# Patient Record
Sex: MALE | Race: WHITE | NOT HISPANIC OR LATINO | Employment: UNEMPLOYED | URBAN - METROPOLITAN AREA
[De-identification: names, ages, dates, MRNs, and addresses within clinical notes are randomized per-mention and may not be internally consistent; named-entity substitution may affect disease eponyms.]

---

## 2022-10-05 ENCOUNTER — APPOINTMENT (OUTPATIENT)
Dept: ULTRASOUND IMAGING | Facility: HOSPITAL | Age: 42
DRG: 263 | End: 2022-10-05
Payer: COMMERCIAL

## 2022-10-05 ENCOUNTER — ANESTHESIA (INPATIENT)
Dept: PERIOP | Facility: HOSPITAL | Age: 42
DRG: 263 | End: 2022-10-05
Payer: COMMERCIAL

## 2022-10-05 ENCOUNTER — APPOINTMENT (EMERGENCY)
Dept: CT IMAGING | Facility: HOSPITAL | Age: 42
DRG: 263 | End: 2022-10-05
Payer: COMMERCIAL

## 2022-10-05 ENCOUNTER — HOSPITAL ENCOUNTER (INPATIENT)
Facility: HOSPITAL | Age: 42
LOS: 1 days | Discharge: HOME | DRG: 263 | End: 2022-10-06
Attending: EMERGENCY MEDICINE | Admitting: SURGERY
Payer: COMMERCIAL

## 2022-10-05 ENCOUNTER — APPOINTMENT (OUTPATIENT)
Dept: MRI IMAGING | Facility: HOSPITAL | Age: 42
DRG: 263 | End: 2022-10-05
Payer: COMMERCIAL

## 2022-10-05 ENCOUNTER — ANESTHESIA EVENT (INPATIENT)
Dept: PERIOP | Facility: HOSPITAL | Age: 42
DRG: 263 | End: 2022-10-05
Payer: COMMERCIAL

## 2022-10-05 DIAGNOSIS — R73.03 PREDIABETES: ICD-10-CM

## 2022-10-05 DIAGNOSIS — K81.0 ACUTE CHOLECYSTITIS: ICD-10-CM

## 2022-10-05 DIAGNOSIS — R10.11 RUQ PAIN: Primary | ICD-10-CM

## 2022-10-05 DIAGNOSIS — E11.65 HYPERGLYCEMIA DUE TO DIABETES MELLITUS (HCC): ICD-10-CM

## 2022-10-05 PROBLEM — F17.200 SMOKING: Status: ACTIVE | Noted: 2022-10-05

## 2022-10-05 PROBLEM — F12.90 MARIJUANA USE: Status: ACTIVE | Noted: 2022-10-05

## 2022-10-05 LAB
ALBUMIN SERPL BCP-MCNC: 4.1 G/DL (ref 3.5–5)
ALP SERPL-CCNC: 67 U/L (ref 34–104)
ALT SERPL W P-5'-P-CCNC: 31 U/L (ref 7–52)
ANION GAP SERPL CALCULATED.3IONS-SCNC: 8 MMOL/L (ref 4–13)
AST SERPL W P-5'-P-CCNC: 29 U/L (ref 13–39)
BASOPHILS # BLD AUTO: 0.02 THOUSANDS/ΜL (ref 0–0.1)
BASOPHILS NFR BLD AUTO: 0 % (ref 0–1)
BILIRUB SERPL-MCNC: 3.08 MG/DL (ref 0.2–1)
BUN SERPL-MCNC: 15 MG/DL (ref 5–25)
CALCIUM SERPL-MCNC: 9.1 MG/DL (ref 8.4–10.2)
CHLORIDE SERPL-SCNC: 104 MMOL/L (ref 96–108)
CHOLEST SERPL-MCNC: 157 MG/DL
CO2 SERPL-SCNC: 26 MMOL/L (ref 21–32)
CREAT SERPL-MCNC: 0.88 MG/DL (ref 0.6–1.3)
EOSINOPHIL # BLD AUTO: 0.02 THOUSAND/ΜL (ref 0–0.61)
EOSINOPHIL NFR BLD AUTO: 0 % (ref 0–6)
ERYTHROCYTE [DISTWIDTH] IN BLOOD BY AUTOMATED COUNT: 12.1 % (ref 11.6–15.1)
EST. AVERAGE GLUCOSE BLD GHB EST-MCNC: 214 MG/DL
GFR SERPL CREATININE-BSD FRML MDRD: 105 ML/MIN/1.73SQ M
GLUCOSE SERPL-MCNC: 164 MG/DL (ref 65–140)
GLUCOSE SERPL-MCNC: 165 MG/DL (ref 65–140)
GLUCOSE SERPL-MCNC: 288 MG/DL (ref 65–140)
GLUCOSE SERPL-MCNC: 374 MG/DL (ref 65–140)
HBA1C MFR BLD: 9.1 %
HCT VFR BLD AUTO: 41 % (ref 36.5–49.3)
HDLC SERPL-MCNC: 47 MG/DL
HGB BLD-MCNC: 14.6 G/DL (ref 12–17)
IMM GRANULOCYTES # BLD AUTO: 0.01 THOUSAND/UL (ref 0–0.2)
IMM GRANULOCYTES NFR BLD AUTO: 0 % (ref 0–2)
LDLC SERPL CALC-MCNC: 98 MG/DL (ref 0–100)
LIPASE SERPL-CCNC: <6 U/L (ref 11–82)
LYMPHOCYTES # BLD AUTO: 1.51 THOUSANDS/ΜL (ref 0.6–4.47)
LYMPHOCYTES NFR BLD AUTO: 26 % (ref 14–44)
MCH RBC QN AUTO: 30.5 PG (ref 26.8–34.3)
MCHC RBC AUTO-ENTMCNC: 35.6 G/DL (ref 31.4–37.4)
MCV RBC AUTO: 86 FL (ref 82–98)
MONOCYTES # BLD AUTO: 0.45 THOUSAND/ΜL (ref 0.17–1.22)
MONOCYTES NFR BLD AUTO: 8 % (ref 4–12)
NEUTROPHILS # BLD AUTO: 3.75 THOUSANDS/ΜL (ref 1.85–7.62)
NEUTS SEG NFR BLD AUTO: 66 % (ref 43–75)
NONHDLC SERPL-MCNC: 110 MG/DL
NRBC BLD AUTO-RTO: 0 /100 WBCS
PLATELET # BLD AUTO: 201 THOUSANDS/UL (ref 149–390)
PMV BLD AUTO: 10.5 FL (ref 8.9–12.7)
POTASSIUM SERPL-SCNC: 3.9 MMOL/L (ref 3.5–5.3)
PROT SERPL-MCNC: 6.1 G/DL (ref 6.4–8.4)
RBC # BLD AUTO: 4.79 MILLION/UL (ref 3.88–5.62)
SODIUM SERPL-SCNC: 138 MMOL/L (ref 135–147)
TRIGL SERPL-MCNC: 59 MG/DL
WBC # BLD AUTO: 5.76 THOUSAND/UL (ref 4.31–10.16)

## 2022-10-05 PROCEDURE — 71260 CT THORAX DX C+: CPT

## 2022-10-05 PROCEDURE — 99222 1ST HOSP IP/OBS MODERATE 55: CPT | Performed by: SURGERY

## 2022-10-05 PROCEDURE — 88304 TISSUE EXAM BY PATHOLOGIST: CPT | Performed by: PATHOLOGY

## 2022-10-05 PROCEDURE — 74177 CT ABD & PELVIS W/CONTRAST: CPT

## 2022-10-05 PROCEDURE — 36415 COLL VENOUS BLD VENIPUNCTURE: CPT | Performed by: EMERGENCY MEDICINE

## 2022-10-05 PROCEDURE — 74181 MRI ABDOMEN W/O CONTRAST: CPT

## 2022-10-05 PROCEDURE — 99284 EMERGENCY DEPT VISIT MOD MDM: CPT | Performed by: EMERGENCY MEDICINE

## 2022-10-05 PROCEDURE — 82948 REAGENT STRIP/BLOOD GLUCOSE: CPT

## 2022-10-05 PROCEDURE — G1004 CDSM NDSC: HCPCS

## 2022-10-05 PROCEDURE — 83036 HEMOGLOBIN GLYCOSYLATED A1C: CPT

## 2022-10-05 PROCEDURE — 96374 THER/PROPH/DIAG INJ IV PUSH: CPT

## 2022-10-05 PROCEDURE — 83690 ASSAY OF LIPASE: CPT | Performed by: EMERGENCY MEDICINE

## 2022-10-05 PROCEDURE — 0FT44ZZ RESECTION OF GALLBLADDER, PERCUTANEOUS ENDOSCOPIC APPROACH: ICD-10-PCS | Performed by: SURGERY

## 2022-10-05 PROCEDURE — 99285 EMERGENCY DEPT VISIT HI MDM: CPT

## 2022-10-05 PROCEDURE — 99253 IP/OBS CNSLTJ NEW/EST LOW 45: CPT | Performed by: INTERNAL MEDICINE

## 2022-10-05 PROCEDURE — 47562 LAPAROSCOPIC CHOLECYSTECTOMY: CPT | Performed by: SURGERY

## 2022-10-05 PROCEDURE — 76705 ECHO EXAM OF ABDOMEN: CPT

## 2022-10-05 PROCEDURE — 80061 LIPID PANEL: CPT

## 2022-10-05 PROCEDURE — 85025 COMPLETE CBC W/AUTO DIFF WBC: CPT | Performed by: EMERGENCY MEDICINE

## 2022-10-05 PROCEDURE — 80053 COMPREHEN METABOLIC PANEL: CPT | Performed by: EMERGENCY MEDICINE

## 2022-10-05 RX ORDER — MIDAZOLAM HYDROCHLORIDE 2 MG/2ML
INJECTION, SOLUTION INTRAMUSCULAR; INTRAVENOUS AS NEEDED
Status: DISCONTINUED | OUTPATIENT
Start: 2022-10-05 | End: 2022-10-05

## 2022-10-05 RX ORDER — SODIUM CHLORIDE, SODIUM LACTATE, POTASSIUM CHLORIDE, CALCIUM CHLORIDE 600; 310; 30; 20 MG/100ML; MG/100ML; MG/100ML; MG/100ML
125 INJECTION, SOLUTION INTRAVENOUS CONTINUOUS
Status: DISCONTINUED | OUTPATIENT
Start: 2022-10-05 | End: 2022-10-05

## 2022-10-05 RX ORDER — MAGNESIUM HYDROXIDE 1200 MG/15ML
LIQUID ORAL AS NEEDED
Status: DISCONTINUED | OUTPATIENT
Start: 2022-10-05 | End: 2022-10-05 | Stop reason: HOSPADM

## 2022-10-05 RX ORDER — KETOROLAC TROMETHAMINE 30 MG/ML
15 INJECTION, SOLUTION INTRAMUSCULAR; INTRAVENOUS ONCE
Status: COMPLETED | OUTPATIENT
Start: 2022-10-05 | End: 2022-10-05

## 2022-10-05 RX ORDER — METHOCARBAMOL 500 MG/1
500 TABLET, FILM COATED ORAL EVERY 6 HOURS PRN
Status: DISCONTINUED | OUTPATIENT
Start: 2022-10-05 | End: 2022-10-06 | Stop reason: HOSPADM

## 2022-10-05 RX ORDER — SODIUM CHLORIDE 9 MG/ML
INJECTION, SOLUTION INTRAVENOUS AS NEEDED
Status: DISCONTINUED | OUTPATIENT
Start: 2022-10-05 | End: 2022-10-05 | Stop reason: HOSPADM

## 2022-10-05 RX ORDER — CEFAZOLIN SODIUM 2 G/50ML
2000 SOLUTION INTRAVENOUS EVERY 8 HOURS
Status: DISCONTINUED | OUTPATIENT
Start: 2022-10-05 | End: 2022-10-05

## 2022-10-05 RX ORDER — HYDROMORPHONE HCL IN WATER/PF 6 MG/30 ML
0.2 PATIENT CONTROLLED ANALGESIA SYRINGE INTRAVENOUS
Status: DISCONTINUED | OUTPATIENT
Start: 2022-10-05 | End: 2022-10-05 | Stop reason: HOSPADM

## 2022-10-05 RX ORDER — SUCCINYLCHOLINE/SOD CL,ISO/PF 100 MG/5ML
SYRINGE (ML) INTRAVENOUS AS NEEDED
Status: DISCONTINUED | OUTPATIENT
Start: 2022-10-05 | End: 2022-10-05

## 2022-10-05 RX ORDER — LIDOCAINE HYDROCHLORIDE 10 MG/ML
INJECTION, SOLUTION EPIDURAL; INFILTRATION; INTRACAUDAL; PERINEURAL AS NEEDED
Status: DISCONTINUED | OUTPATIENT
Start: 2022-10-05 | End: 2022-10-05

## 2022-10-05 RX ORDER — DEXMEDETOMIDINE HYDROCHLORIDE 100 UG/ML
INJECTION, SOLUTION INTRAVENOUS AS NEEDED
Status: DISCONTINUED | OUTPATIENT
Start: 2022-10-05 | End: 2022-10-05

## 2022-10-05 RX ORDER — PROPOFOL 10 MG/ML
INJECTION, EMULSION INTRAVENOUS AS NEEDED
Status: DISCONTINUED | OUTPATIENT
Start: 2022-10-05 | End: 2022-10-05

## 2022-10-05 RX ORDER — KETOROLAC TROMETHAMINE 30 MG/ML
15 INJECTION, SOLUTION INTRAMUSCULAR; INTRAVENOUS EVERY 6 HOURS SCHEDULED
Status: DISCONTINUED | OUTPATIENT
Start: 2022-10-06 | End: 2022-10-06 | Stop reason: HOSPADM

## 2022-10-05 RX ORDER — DEXAMETHASONE SODIUM PHOSPHATE 10 MG/ML
INJECTION, SOLUTION INTRAMUSCULAR; INTRAVENOUS AS NEEDED
Status: DISCONTINUED | OUTPATIENT
Start: 2022-10-05 | End: 2022-10-05

## 2022-10-05 RX ORDER — HYDROMORPHONE HCL/PF 1 MG/ML
0.5 SYRINGE (ML) INJECTION
Status: DISCONTINUED | OUTPATIENT
Start: 2022-10-05 | End: 2022-10-05 | Stop reason: HOSPADM

## 2022-10-05 RX ORDER — METOCLOPRAMIDE HYDROCHLORIDE 5 MG/ML
10 INJECTION INTRAMUSCULAR; INTRAVENOUS ONCE AS NEEDED
Status: DISCONTINUED | OUTPATIENT
Start: 2022-10-05 | End: 2022-10-05 | Stop reason: HOSPADM

## 2022-10-05 RX ORDER — INSULIN LISPRO 100 [IU]/ML
1-6 INJECTION, SOLUTION INTRAVENOUS; SUBCUTANEOUS EVERY 6 HOURS SCHEDULED
Status: DISCONTINUED | OUTPATIENT
Start: 2022-10-05 | End: 2022-10-06

## 2022-10-05 RX ORDER — SODIUM CHLORIDE, SODIUM LACTATE, POTASSIUM CHLORIDE, CALCIUM CHLORIDE 600; 310; 30; 20 MG/100ML; MG/100ML; MG/100ML; MG/100ML
INJECTION, SOLUTION INTRAVENOUS CONTINUOUS PRN
Status: DISCONTINUED | OUTPATIENT
Start: 2022-10-05 | End: 2022-10-05

## 2022-10-05 RX ORDER — ACETAMINOPHEN 325 MG/1
650 TABLET ORAL EVERY 6 HOURS PRN
Status: DISCONTINUED | OUTPATIENT
Start: 2022-10-05 | End: 2022-10-05

## 2022-10-05 RX ORDER — ENOXAPARIN SODIUM 100 MG/ML
40 INJECTION SUBCUTANEOUS DAILY
Status: DISCONTINUED | OUTPATIENT
Start: 2022-10-05 | End: 2022-10-06 | Stop reason: HOSPADM

## 2022-10-05 RX ORDER — KETOROLAC TROMETHAMINE 30 MG/ML
INJECTION, SOLUTION INTRAMUSCULAR; INTRAVENOUS AS NEEDED
Status: DISCONTINUED | OUTPATIENT
Start: 2022-10-05 | End: 2022-10-05

## 2022-10-05 RX ORDER — METRONIDAZOLE 500 MG/100ML
500 INJECTION, SOLUTION INTRAVENOUS EVERY 8 HOURS
Status: DISCONTINUED | OUTPATIENT
Start: 2022-10-05 | End: 2022-10-05

## 2022-10-05 RX ORDER — FENTANYL CITRATE/PF 50 MCG/ML
25 SYRINGE (ML) INJECTION
Status: DISCONTINUED | OUTPATIENT
Start: 2022-10-05 | End: 2022-10-05 | Stop reason: HOSPADM

## 2022-10-05 RX ORDER — OXYCODONE HYDROCHLORIDE 5 MG/1
5 TABLET ORAL EVERY 4 HOURS PRN
Qty: 6 TABLET | Refills: 0 | Status: SHIPPED | OUTPATIENT
Start: 2022-10-05

## 2022-10-05 RX ORDER — SODIUM CHLORIDE, SODIUM LACTATE, POTASSIUM CHLORIDE, CALCIUM CHLORIDE 600; 310; 30; 20 MG/100ML; MG/100ML; MG/100ML; MG/100ML
125 INJECTION, SOLUTION INTRAVENOUS CONTINUOUS
Status: DISCONTINUED | OUTPATIENT
Start: 2022-10-05 | End: 2022-10-06

## 2022-10-05 RX ORDER — FENTANYL CITRATE 50 UG/ML
INJECTION, SOLUTION INTRAMUSCULAR; INTRAVENOUS AS NEEDED
Status: DISCONTINUED | OUTPATIENT
Start: 2022-10-05 | End: 2022-10-05

## 2022-10-05 RX ORDER — LABETALOL HYDROCHLORIDE 5 MG/ML
10 INJECTION, SOLUTION INTRAVENOUS
Status: DISCONTINUED | OUTPATIENT
Start: 2022-10-05 | End: 2022-10-05 | Stop reason: HOSPADM

## 2022-10-05 RX ORDER — PROPOFOL 10 MG/ML
INJECTION, EMULSION INTRAVENOUS CONTINUOUS PRN
Status: DISCONTINUED | OUTPATIENT
Start: 2022-10-05 | End: 2022-10-05

## 2022-10-05 RX ORDER — ROCURONIUM BROMIDE 10 MG/ML
INJECTION, SOLUTION INTRAVENOUS AS NEEDED
Status: DISCONTINUED | OUTPATIENT
Start: 2022-10-05 | End: 2022-10-05

## 2022-10-05 RX ORDER — ONDANSETRON 2 MG/ML
INJECTION INTRAMUSCULAR; INTRAVENOUS AS NEEDED
Status: DISCONTINUED | OUTPATIENT
Start: 2022-10-05 | End: 2022-10-05

## 2022-10-05 RX ORDER — ONDANSETRON 2 MG/ML
4 INJECTION INTRAMUSCULAR; INTRAVENOUS ONCE AS NEEDED
Status: DISCONTINUED | OUTPATIENT
Start: 2022-10-05 | End: 2022-10-05 | Stop reason: HOSPADM

## 2022-10-05 RX ORDER — GLYCOPYRROLATE 0.2 MG/ML
INJECTION INTRAMUSCULAR; INTRAVENOUS AS NEEDED
Status: DISCONTINUED | OUTPATIENT
Start: 2022-10-05 | End: 2022-10-05

## 2022-10-05 RX ORDER — HYDRALAZINE HYDROCHLORIDE 20 MG/ML
5 INJECTION INTRAMUSCULAR; INTRAVENOUS
Status: DISCONTINUED | OUTPATIENT
Start: 2022-10-05 | End: 2022-10-05 | Stop reason: HOSPADM

## 2022-10-05 RX ORDER — BUPIVACAINE HYDROCHLORIDE AND EPINEPHRINE 2.5; 5 MG/ML; UG/ML
INJECTION, SOLUTION EPIDURAL; INFILTRATION; INTRACAUDAL; PERINEURAL AS NEEDED
Status: DISCONTINUED | OUTPATIENT
Start: 2022-10-05 | End: 2022-10-05 | Stop reason: HOSPADM

## 2022-10-05 RX ORDER — CEFAZOLIN SODIUM 1 G/3ML
INJECTION, POWDER, FOR SOLUTION INTRAMUSCULAR; INTRAVENOUS AS NEEDED
Status: DISCONTINUED | OUTPATIENT
Start: 2022-10-05 | End: 2022-10-05

## 2022-10-05 RX ORDER — NEOSTIGMINE METHYLSULFATE 1 MG/ML
INJECTION INTRAVENOUS AS NEEDED
Status: DISCONTINUED | OUTPATIENT
Start: 2022-10-05 | End: 2022-10-05

## 2022-10-05 RX ORDER — ALBUTEROL SULFATE 2.5 MG/3ML
2.5 SOLUTION RESPIRATORY (INHALATION) ONCE AS NEEDED
Status: DISCONTINUED | OUTPATIENT
Start: 2022-10-05 | End: 2022-10-05 | Stop reason: HOSPADM

## 2022-10-05 RX ORDER — ACETAMINOPHEN 325 MG/1
975 TABLET ORAL EVERY 8 HOURS SCHEDULED
Status: DISCONTINUED | OUTPATIENT
Start: 2022-10-05 | End: 2022-10-06 | Stop reason: HOSPADM

## 2022-10-05 RX ORDER — PROMETHAZINE HYDROCHLORIDE 25 MG/ML
12.5 INJECTION, SOLUTION INTRAMUSCULAR; INTRAVENOUS ONCE AS NEEDED
Status: DISCONTINUED | OUTPATIENT
Start: 2022-10-05 | End: 2022-10-05 | Stop reason: HOSPADM

## 2022-10-05 RX ORDER — ONDANSETRON 2 MG/ML
4 INJECTION INTRAMUSCULAR; INTRAVENOUS EVERY 6 HOURS PRN
Status: DISCONTINUED | OUTPATIENT
Start: 2022-10-05 | End: 2022-10-06 | Stop reason: HOSPADM

## 2022-10-05 RX ORDER — HYDROMORPHONE HCL 110MG/55ML
PATIENT CONTROLLED ANALGESIA SYRINGE INTRAVENOUS AS NEEDED
Status: DISCONTINUED | OUTPATIENT
Start: 2022-10-05 | End: 2022-10-05

## 2022-10-05 RX ADMIN — PROPOFOL 200 MG: 10 INJECTION, EMULSION INTRAVENOUS at 16:11

## 2022-10-05 RX ADMIN — PROPOFOL 50 MCG/KG/MIN: 10 INJECTION, EMULSION INTRAVENOUS at 16:14

## 2022-10-05 RX ADMIN — CEFAZOLIN SODIUM 2000 MG: 2 SOLUTION INTRAVENOUS at 13:20

## 2022-10-05 RX ADMIN — KETOROLAC TROMETHAMINE 15 MG: 30 INJECTION, SOLUTION INTRAMUSCULAR at 17:09

## 2022-10-05 RX ADMIN — ONDANSETRON 4 MG: 2 INJECTION INTRAMUSCULAR; INTRAVENOUS at 16:11

## 2022-10-05 RX ADMIN — DEXMEDETOMIDINE HYDROCHLORIDE 8 MCG: 100 INJECTION, SOLUTION INTRAVENOUS at 16:57

## 2022-10-05 RX ADMIN — METRONIDAZOLE 500 MG: 500 INJECTION, SOLUTION INTRAVENOUS at 11:18

## 2022-10-05 RX ADMIN — SODIUM CHLORIDE, SODIUM LACTATE, POTASSIUM CHLORIDE, AND CALCIUM CHLORIDE 125 ML/HR: .6; .31; .03; .02 INJECTION, SOLUTION INTRAVENOUS at 19:38

## 2022-10-05 RX ADMIN — MIDAZOLAM HYDROCHLORIDE 2 MG: 1 INJECTION, SOLUTION INTRAMUSCULAR; INTRAVENOUS at 16:09

## 2022-10-05 RX ADMIN — HYDROMORPHONE HYDROCHLORIDE 0.5 MG: 2 INJECTION INTRAMUSCULAR; INTRAVENOUS; SUBCUTANEOUS at 16:11

## 2022-10-05 RX ADMIN — GLYCOPYRROLATE 0.5 MG: 0.2 INJECTION, SOLUTION INTRAMUSCULAR; INTRAVENOUS at 17:03

## 2022-10-05 RX ADMIN — LIDOCAINE HYDROCHLORIDE 80 MG: 10 INJECTION, SOLUTION EPIDURAL; INFILTRATION; INTRACAUDAL; PERINEURAL at 16:11

## 2022-10-05 RX ADMIN — ACETAMINOPHEN 975 MG: 325 TABLET, FILM COATED ORAL at 19:38

## 2022-10-05 RX ADMIN — INSULIN LISPRO 1 UNITS: 100 INJECTION, SOLUTION INTRAVENOUS; SUBCUTANEOUS at 14:24

## 2022-10-05 RX ADMIN — KETOROLAC TROMETHAMINE 15 MG: 30 INJECTION, SOLUTION INTRAMUSCULAR at 23:17

## 2022-10-05 RX ADMIN — DEXMEDETOMIDINE HYDROCHLORIDE 12 MCG: 100 INJECTION, SOLUTION INTRAVENOUS at 16:38

## 2022-10-05 RX ADMIN — SODIUM CHLORIDE, SODIUM LACTATE, POTASSIUM CHLORIDE, AND CALCIUM CHLORIDE 125 ML/HR: .6; .31; .03; .02 INJECTION, SOLUTION INTRAVENOUS at 10:43

## 2022-10-05 RX ADMIN — KETOROLAC TROMETHAMINE 15 MG: 30 INJECTION, SOLUTION INTRAMUSCULAR at 06:14

## 2022-10-05 RX ADMIN — FENTANYL CITRATE 50 MCG: 50 INJECTION, SOLUTION INTRAMUSCULAR; INTRAVENOUS at 16:16

## 2022-10-05 RX ADMIN — IOHEXOL 100 ML: 350 INJECTION, SOLUTION INTRAVENOUS at 07:00

## 2022-10-05 RX ADMIN — NEOSTIGMINE METHYLSULFATE 4 MG: 1 INJECTION INTRAVENOUS at 17:03

## 2022-10-05 RX ADMIN — DEXAMETHASONE SODIUM PHOSPHATE 10 MG: 10 INJECTION, SOLUTION INTRAMUSCULAR; INTRAVENOUS at 16:11

## 2022-10-05 RX ADMIN — ROCURONIUM BROMIDE 50 MG: 10 SOLUTION INTRAVENOUS at 16:15

## 2022-10-05 RX ADMIN — SODIUM CHLORIDE, SODIUM LACTATE, POTASSIUM CHLORIDE, AND CALCIUM CHLORIDE: .6; .31; .03; .02 INJECTION, SOLUTION INTRAVENOUS at 16:39

## 2022-10-05 RX ADMIN — GLYCOPYRROLATE 0.1 MG: 0.2 INJECTION, SOLUTION INTRAMUSCULAR; INTRAVENOUS at 16:11

## 2022-10-05 RX ADMIN — Medication 100 MG: at 16:12

## 2022-10-05 RX ADMIN — INSULIN LISPRO 6 UNITS: 100 INJECTION, SOLUTION INTRAVENOUS; SUBCUTANEOUS at 23:25

## 2022-10-05 RX ADMIN — FENTANYL CITRATE 50 MCG: 50 INJECTION, SOLUTION INTRAMUSCULAR; INTRAVENOUS at 16:09

## 2022-10-05 RX ADMIN — HYDROMORPHONE HYDROCHLORIDE 0.5 MG: 2 INJECTION INTRAMUSCULAR; INTRAVENOUS; SUBCUTANEOUS at 16:40

## 2022-10-05 RX ADMIN — SODIUM CHLORIDE, SODIUM LACTATE, POTASSIUM CHLORIDE, AND CALCIUM CHLORIDE 125 ML/HR: .6; .31; .03; .02 INJECTION, SOLUTION INTRAVENOUS at 23:28

## 2022-10-05 RX ADMIN — CEFAZOLIN SODIUM 2000 MG: 1 INJECTION, POWDER, FOR SOLUTION INTRAMUSCULAR; INTRAVENOUS at 16:22

## 2022-10-05 NOTE — QUICK NOTE
Quick Note    Updated Ms Dunia Vidal from [facility]  Pt aware and agreeable  Discussed plan for surgery this evening and potentially d/c 10/6  She states they are able to take narcotic rx  Rx sent to their preferred pharmacy  All questions answered over the phone        Norma Novak  10/5/2022 4:20 PM

## 2022-10-05 NOTE — ASSESSMENT & PLAN NOTE
Lab Results   Component Value Date    HGBA1C 9 1 (H) 10/05/2022       Recent Labs     10/06/22  0001 10/06/22  0245 10/06/22  0728 10/06/22  1126   POCGLU 338* 252* 203* 219*       Blood Sugar Average: Last 72 hrs:    · Blood glucose level on presentation 288    Home Regimen: None    Plan:  • Patient can be discharged on metformin 500 b i d  With meals  • Patient mentioned that he has been on metformin previously  • Patient advised to monitor his blood glucose at home  • Outpatient follow-up with PCP

## 2022-10-05 NOTE — ANESTHESIA PREPROCEDURE EVALUATION
Procedure:  CHOLECYSTECTOMY LAPAROSCOPIC (N/A Abdomen)    Relevant Problems   ANESTHESIA (within normal limits)      PULMONARY   (+) Smoking      Digestive   (+) Acute cholecystitis      Other   (+) Marijuana use        Physical Exam    Airway    Mallampati score: I  TM Distance: >3 FB  Neck ROM: full     Dental   Comment: Missing teeth  No loose,     Cardiovascular  Rhythm: regular, Rate: normal, Cardiovascular exam normal    Pulmonary  Pulmonary exam normal Breath sounds clear to auscultation,     Other Findings        Anesthesia Plan  ASA Score- 2     Anesthesia Type- general with ASA Monitors  Additional Monitors:   Airway Plan: ETT  Plan Factors-Exercise tolerance (METS): >4 METS  Chart reviewed  Existing labs reviewed  Patient summary reviewed  Patient is a current smoker  Patient smoked on day of surgery  Induction- intravenous  Postoperative Plan- Plan for postoperative opioid use  Planned trial extubation    Informed Consent- Anesthetic plan and risks discussed with patient  I personally reviewed this patient with the CRNA  Discussed and agreed on the Anesthesia Plan with the CRNA  Juve Chavez

## 2022-10-05 NOTE — ED PROVIDER NOTES
History  Chief Complaint   Patient presents with   • Chest Pain     Pt leaned over on Saturday and felt a "pop" in his R side ribcage, pain since      Patient is a 70-year-old male presenting with right-sided abdominal pain  Patient states 4 days ago he bent over to  a water bottle and felt a pop in the right side of his abdomen and got a sharp pain  The pain has progressively gotten worse since then, and is worsened by any movement, bumps, pain is alleviated by staying still and radiates around to his back  Patient patient had 1 episode of diarrhea but denies any chest pain, recent illnesses, fever, chills, pain with urination  Patient has not taken anything for the pain and does not take any regular medication  None       History reviewed  No pertinent past medical history  History reviewed  No pertinent surgical history  History reviewed  No pertinent family history  I have reviewed and agree with the history as documented  E-Cigarette/Vaping     E-Cigarette/Vaping Substances           Review of Systems   Constitutional: Negative for chills and fever  HENT: Negative for ear pain and sore throat  Eyes: Negative for pain and visual disturbance  Respiratory: Positive for shortness of breath (with pain)  Negative for cough  Cardiovascular: Negative for chest pain and palpitations  Gastrointestinal: Positive for abdominal pain (RUQ radiates to flank) and diarrhea  Negative for blood in stool, nausea and vomiting  Genitourinary: Positive for flank pain (right side)  Negative for dysuria and hematuria  Musculoskeletal: Negative for arthralgias and back pain  Skin: Negative for color change and rash  Neurological: Negative for dizziness, seizures, syncope, light-headedness and headaches  All other systems reviewed and are negative        Physical Exam  ED Triage Vitals [10/05/22 0534]   Temperature Pulse Respirations Blood Pressure SpO2   98 5 °F (36 9 °C) 89 20 127/65 98 %      Temp Source Heart Rate Source Patient Position - Orthostatic VS BP Location FiO2 (%)   Oral Monitor Lying Right arm --      Pain Score       --             Orthostatic Vital Signs  Vitals:    10/05/22 0534   BP: 127/65   Pulse: 89   Patient Position - Orthostatic VS: Lying       Physical Exam  HENT:      Head: Normocephalic and atraumatic  Eyes:      Conjunctiva/sclera: Conjunctivae normal    Cardiovascular:      Rate and Rhythm: Normal rate and regular rhythm  Heart sounds: Normal heart sounds  Pulmonary:      Effort: Pulmonary effort is normal  No respiratory distress  Breath sounds: Normal breath sounds  Chest:      Chest wall: Tenderness (right lower ribs, pain is refered to RUQ abd) present  Abdominal:      General: Abdomen is flat  Bowel sounds are normal  There is no distension  Palpations: Abdomen is soft  Tenderness: There is abdominal tenderness (RUQ, very tender)  There is no right CVA tenderness or left CVA tenderness  Musculoskeletal:         General: No swelling, deformity or signs of injury  Skin:     General: Skin is warm and dry  Findings: No bruising, lesion or rash  Neurological:      Mental Status: He is alert           ED Medications  Medications   ketorolac (TORADOL) injection 15 mg (15 mg Intravenous Given 10/5/22 0614)   iohexol (OMNIPAQUE) 350 MG/ML injection (SINGLE-DOSE) 100 mL (100 mL Intravenous Given 10/5/22 0700)       Diagnostic Studies  Results Reviewed     Procedure Component Value Units Date/Time    Comprehensive metabolic panel [[id number]]  (Abnormal) Collected: 10/05/22 0613    Lab Status: Final result Specimen: Blood from Arm, Left Updated: 10/05/22 0643     Sodium 138 mmol/L      Potassium 3 9 mmol/L      Chloride 104 mmol/L      CO2 26 mmol/L      ANION GAP 8 mmol/L      BUN 15 mg/dL      Creatinine 0 88 mg/dL      Glucose 288 mg/dL      Calcium 9 1 mg/dL      AST 29 U/L      ALT 31 U/L      Alkaline Phosphatase 67 U/L      Total Protein 6 1 g/dL      Albumin 4 1 g/dL      Total Bilirubin 3 08 mg/dL      eGFR 105 ml/min/1 73sq m     Narrative:      [place] guidelines for Chronic Kidney Disease (CKD):   •  Stage 1 with normal or high GFR (GFR > 90 mL/min/1 73 square meters)  •  Stage 2 Mild CKD (GFR = 60-89 mL/min/1 73 square meters)  •  Stage 3A Moderate CKD (GFR = 45-59 mL/min/1 73 square meters)  •  Stage 3B Moderate CKD (GFR = 30-44 mL/min/1 73 square meters)  •  Stage 4 Severe CKD (GFR = 15-29 mL/min/1 73 square meters)  •  Stage 5 End Stage CKD (GFR <15 mL/min/1 73 square meters)  Note: GFR calculation is accurate only with a steady state creatinine    Lipase [[id number]]  (Abnormal) Collected: 10/05/22 0613    Lab Status: Final result Specimen: Blood from Arm, Left Updated: 10/05/22 0643     Lipase <6 u/L     CBC and differential [[id number]] Collected: 10/05/22 6401    Lab Status: Final result Specimen: Blood from Arm, Left Updated: 10/05/22 0621     WBC 5 76 Thousand/uL      RBC 4 79 Million/uL      Hemoglobin 14 6 g/dL      Hematocrit 41 0 %      MCV 86 fL      MCH 30 5 pg      MCHC 35 6 g/dL      RDW 12 1 %      MPV 10 5 fL      Platelets 523 Thousands/uL      nRBC 0 /100 WBCs      Neutrophils Relative 66 %      Immat GRANS % 0 %      Lymphocytes Relative 26 %      Monocytes Relative 8 %      Eosinophils Relative 0 %      Basophils Relative 0 %      Neutrophils Absolute 3 75 Thousands/µL      Immature Grans Absolute 0 01 Thousand/uL      Lymphocytes Absolute 1 51 Thousands/µL      Monocytes Absolute 0 45 Thousand/µL      Eosinophils Absolute 0 02 Thousand/µL      Basophils Absolute 0 02 Thousands/µL                  CT chest abdomen pelvis w contrast    (Results Pending)   US right upper quadrant    (Results Pending)         Procedures  Procedures      ED Course  ED Course as of 10/05/22 0810   Wed Oct 05, 2022   0647 Lipase(!): <6   0647 WBC: 5 76   0648 Glucose, Random(!): 288   0648 TOTAL BILIRUBIN(!): 3 08   0648 AST: 29   0648 ALT: 31                                       [facility]  Number of Diagnoses or Management Options  Diagnosis management comments: Patient is a 79-year-old male presenting with right upper quadrant pain  Ddx- Rib fracture, intraabdominal infection, biliary disease, intraabdominal hematoma, muscle strain    Pt history is concerning for MSK etiology but due to pain out of proportion over the RUQ concern for intraabdominal process is increased  CT results: Official Results Pending, So mass appreciated in gall bladder  RUQ U/S ordered  Pt signed out to Dr Vandana Peña  Disposition  Final diagnoses:   RUQ pain     Time reflects when diagnosis was documented in both MDM as applicable and the Disposition within this note     Time User Action Codes Description Comment    10/5/2022  8:10 AM Athena Osgood Add [R10 11] RUQ pain       ED Disposition     None      Follow-up Information    None         Patient's Medications    No medications on file     No discharge procedures on file  PDMP Review     None           ED Provider  Attending physically available and evaluated Russell eBrnard  YIFAN managed the patient along with the ED Attending      Electronically Signed by         Vane Zamarripa MD  10/05/22 4665

## 2022-10-05 NOTE — DISCHARGE INSTR - AVS FIRST PAGE
Please call the office when you leave to schedule an appointment for 2 weeks  Please call [phone]                      [address], [address], [place], [address]  [address] of [address] between [employer] and [employer]  Activity:    May lift 10 lb as many times as desired the 1st week,       20 lb in 2 weeks,       30 lb in 3 weeks  Walking is encouraged  Normal daily activities including climbing steps are okay  Do not engage in strenuous activity ( sit-ups or crutches) or contact sports for 4-6 weeks post-operatively    Return to Work:   Okay to return to work when you feel well if you desire  Diet:   You may return to your normal healthy diet  Wound Care: Your wound is closed with dissolvable stitches and glue  It is okay to shower  Wash incision gently with soap and water and pat dry  Do not soak incisions in bath water or swim for two weeks  Do not apply any creams or ointments  Pain Medication:   Please take as directed if needed  May use Advil or Motrin in addition  Recall, the pain medicine and anesthesia is associated with constipation  No driving while taking narcotic pain medications  Other: It is normal to developed a “healing ridge” / firm incision after surgery  This is your body making scar tissue  It is a good sign  Constipation is very common after general anesthesia  Please use milk of magnesia as needed in order to help prevent constipation  It is normal to get bruising after surgery  If you have questions after discharge please call the office      If you have increased pain, fever >101 5, increased drainage, redness or a bad smell at your surgery site, please call us immediately or come directly to the Emergency Room

## 2022-10-05 NOTE — ED NOTES
Mr Enrico Grewal from [facility]-, would like an update on this pt when the RN gets a chance, please call [phone]     Marcos Tadeo  10/05/22 0521

## 2022-10-05 NOTE — ED ATTENDING ATTESTATION
10/5/2022  ITrudi, DO, saw and evaluated the patient  I have discussed the patient with the resident/non-physician practitioner and agree with the resident's/non-physician practitioner's findings, Plan of Care, and MDM as documented in the resident's/non-physician practitioner's note, except where noted  All available labs and Radiology studies were reviewed  I was present for key portions of any procedure(s) performed by the resident/non-physician practitioner and I was immediately available to provide assistance  At this point I agree with the current assessment done in the Emergency Department  I have conducted an independent evaluation of this patient a history and physical is as follows:    ED Course    74-year-old male presents the emergency department by EMS for evaluation of right-sided chest and abdominal pain  Patient states that he bent over to  a bottle of water on Saturday 4 days ago when he had acute onset of sharp right-sided chest wall pain  Patient states since that time the pain has migrated down into his abdomen and is severe in intensity  It is aggravated with movement and palpation  Hitting bumps wall traveling is intolerable  He denies shortness of breath  No fevers or chills  No nausea vomiting  Patient did have 1 episode of diarrhea yesterday  Past medical history:  Prediabetes    Physical exam:  Patient is awake and alert  He has acute distress due to pain  Pupils are equal reactive  Mucous membranes are moist   Neck is supple nontender with normal range of motion  Heart is regular rate and rhythm without murmur  Lungs are clear to auscultation bilateral   There is exquisite tenderness to palpation of the right lateral chest wall from the 6th rib down to the right upper quadrant  There is exquisite tenderness of the right upper quadrant  Voluntary guarding noted  No bruising or rash noted  Normal active bowel sounds noted  No rebound    No CVA tenderness  Patient has 5/5 strength in all 4 extremities  Plan:  76-year-old male presents with worsening right sided chest and abdominal pain after feeling a pop sensation 4 days ago  Patient does have pain out of proportion to exam   Will check CT chest and abdomen to evaluate for possible intra-abdominal injury vs  Other intraabdominal pathology, suspect possible rib fracture or cartilage injury    Will order pain medication and reassess      Critical Care Time  Procedures

## 2022-10-05 NOTE — OP NOTE
OPERATIVE REPORT  PATIENT NAME: Danielle Aguayo    :  1980  MRN: [id number]  Pt Location: [facility] OR ROOM 03    SURGERY DATE: 10/5/2022    Surgeon(s) and Role:     * Jolene Cox MD - Primary     * Colt Cerna DO - Assisting    Preop Diagnosis:  Acute calculous cholecystitis    Post-Op Diagnosis Codes:  Acute calculous cholecystitis    Procedure(s) (LRB):  CHOLECYSTECTOMY LAPAROSCOPIC (N/A)    Specimen(s):  ID Type Source Tests Collected by Time Destination   1 :  Tissue Gallbladder TISSUE EXAM Jolene Cox MD 10/5/2022  4:34 PM        Estimated Blood Loss:   Minimal    Drains:  * No LDAs found *    Anesthesia Type:   General    Operative Indications:    RUQ pain [R10 11]  Independent, nonsmoker, ASA to emergency, wound class 2, BMI 26, weight 180, height 70     Smoking       Digestive   (+) Acute cholecystitis       Other   (+) Marijuana use                       Complications:   None    Procedure and Technique:  Danielle Aguayo is a 36 y o  male was brought into the operative suite and identified visually and by arm band  The patient was placed in the supine position  Careful attention towards positioning of extremities was completed  After sterile prep and drape a timeout was completed  Athrombic pumps in place  Antibiotics provided  After instillation of local analgesia an incision was made at the umbilicus   With blunt dissection the peritoneum was identified  This was pulled upward using Kocher clamps  An incision was made  Hemostat was used to bluntly puncture the peritoneum  Intra-abdominal location was verified  A trocar was then inserted  CO2 was then insufflated with a back pressure of 15  After Marcaine instillation at each additional site, additional trochars are placed under direct vision into the upper aspect of the abdomen  Laparoscopic visualization revealed a normal liver and normal stomach  excess peritoneal fluid was present    Gallbladder was distended and tense   There was aspirated decompressing this of thick inspissated bile  Edema of the gallbladder was present consistent with acute calculous cholecystitis  The gallbladder was pulled with traction inferiorly, and the liver was pushed superior  A dome down technique was completed using electrocautery  This technique carried down to the level of Vazquez's pouch  Careful attention was made towards location of the right hepatic artery, cystic artery, common bile duct, right hepatic duct and the cystic duct  Careful attention was made towards the critical anatomy at this region  The cystic duct was identified inserting into the base of the gallbladder  Cystic artery was identified also inserting into the base of the gallbladder  The cystic duct was then clipped ×3 and divided  The cystic artery was clipped ×3 and divided  The gallbladder was then removed from the liver bed with additional electrocautery  The area was copiously irrigated  Hemostasis was assured  The gallbladder was placed into an Endo-Catch bag, and was then removed through the umbilical incision  Fascia was closed with 0 Vicryl suture  The subcutaneous components were irrigated  The subcuticular incisions were then closed  Histacryl was then applied  The patient was awakened from general anesthesia and transferred to the recovery room in stable condition  Sponge and instrument count correct ×2  A postoperative deep briefing was completed       I was present for the entire procedure    Patient Disposition:  PACU         SIGNATURE: Mattie Duane, MD  DATE: October 5, 2022  TIME: 6:12 PM

## 2022-10-05 NOTE — ANESTHESIA POSTPROCEDURE EVALUATION
Post-Op Assessment Note    CV Status:  Stable  Pain Score: 0    Pain management: adequate     Mental Status:  Sleepy and arousable   Hydration Status:  Euvolemic   PONV Controlled:  Controlled   Airway Patency:  Patent      Post Op Vitals Reviewed: Yes      Staff: CRNA         No complications documented      BP   136/81   Temp   98 2   Pulse 69   Resp   24   SpO2   96%

## 2022-10-05 NOTE — ED CARE HANDOFF
Emergency Department Sign Out Note        Sign out and transfer of care from Dr Josey Escalante  See Separate Emergency Department note  The patient, Sabra Roper, was evaluated by the previous provider for RUQ pain  Workup Completed:  CBC, CMP, CT c/a/p    ED Course / Workup Pending (followup):  RUQ US and CT c/a/p read pending  Patient came in with RUQ pain since Saturday after bending down picking up or bottle and hearing a pop  Worsened with movement and palpation relieved with rest and medication  CT c/a/p showed signs of acute cholecystitis with cholelithiasis, surgery consulted  Surgery spoke with the patient and will admit to their service  Procedures  MDM    Disposition  Final diagnoses:   RUQ pain   Acute cholecystitis     Time reflects when diagnosis was documented in both MDM as applicable and the Disposition within this note     Time User Action Codes Description Comment    10/5/2022  8:10 AM Martha Hamlin Add [R10 11] RUQ pain     10/5/2022 10:24 AM Winnie Bergeron Add [R73 03] Prediabetes     10/5/2022 10:28 AM Mari Sue Add [K81 0] Acute cholecystitis       ED Disposition     ED Disposition   Admit    Condition   Stable    Date/Time   Wed Oct 5, 2022 10:28 AM    Comment   Case was discussed with Surgery and the patient's admission status was agreed to be Admission Status: inpatient status            Follow-up Information    None       Patient's Medications    No medications on file     No discharge procedures on file         ED Provider  Electronically Signed by     Lissa Jean-Baptiste DO  10/05/22 1026

## 2022-10-05 NOTE — H&P
Acute Care Surgery  History and Physical  Russell Bernard 36 y o  male MRN: [id number]  Unit/Bed#: ED-32 Encounter: [id number]    Assessment and Plan:  35 yo M with cholecystitis, r/o choledocholithiasis  AVSS, WBC 5  Tbili 3 0    - admit to surgery  - NPO/IVF  - MRI to r/o choledocholithiasis  - trend Tbili  - psb lap [facility] this admission pending MRI results  - SLIM c/s for elevated blood glucose  - dvt ppx  - ancef/flagyl  - prn pain, antiemetic regimen  - SSI      History of Present Illness   History, ROS and PFSH unobtainable from any source due to none  HPI:  Russell Bernard is a 36 y o  male who presents with RUQ pain x 4 days  Patient states he has never had this pain before, located in right upper quadrant with radiation to back  Denies any nausea, vomiting, fever, chills, chest pain, shortness of breath, changes to bladder habits  Patient is a current resident of inpatient rehab at [facility]  Review of Systems   Constitutional: Negative for chills and fever  Respiratory: Negative  Cardiovascular: Negative  Gastrointestinal: Positive for abdominal pain  Negative for constipation, diarrhea, nausea and vomiting  Genitourinary: Negative  Skin: Negative  All other systems reviewed and are negative  Historical Information   History reviewed  No pertinent past medical history  History reviewed  No pertinent surgical history    Social History   Social History     Substance and Sexual Activity   Alcohol Use None     Social History     Substance and Sexual Activity   Drug Use Not on file     Social History     Tobacco Use   Smoking Status Not on file   Smokeless Tobacco Not on file     Family History:   Family History   Problem Relation Age of Onset   • Diabetes unspecified Mother    • Diabetes type I Brother        Meds/Allergies   PTA meds:   None     No Known Allergies    Objective   First Vitals:   Blood Pressure: 127/65 (10/05/22 0534)  Pulse: 89 (10/05/22 0534)  Temperature: 98 5 °F (36 9 °C) (10/05/22 0534)  Temp Source: Oral (10/05/22 0534)  Respirations: 20 (10/05/22 0534)  SpO2: 98 % (10/05/22 0534)    Current Vitals:   Blood Pressure: 119/72 (10/05/22 0849)  Pulse: 82 (10/05/22 0849)  Temperature: 98 5 °F (36 9 °C) (10/05/22 0534)  Temp Source: Oral (10/05/22 0534)  Respirations: 18 (10/05/22 0849)  SpO2: 98 % (10/05/22 0849)    No intake or output data in the 24 hours ending 10/05/22 1058    Invasive Devices  Report    Peripheral Intravenous Line  Duration           Peripheral IV 10/05/22 Left Antecubital <1 day                Physical Exam  Vitals and nursing note reviewed  Constitutional:       General: He is not in acute distress  Appearance: Normal appearance  He is normal weight  He is not ill-appearing, toxic-appearing or diaphoretic  HENT:      Head: Normocephalic and atraumatic  Mouth/Throat:      Mouth: Mucous membranes are dry  Eyes:      Extraocular Movements: Extraocular movements intact  Cardiovascular:      Rate and Rhythm: Normal rate  Pulmonary:      Effort: No respiratory distress  Abdominal:      General: Abdomen is flat  There is no distension  Palpations: Abdomen is soft  Tenderness: There is abdominal tenderness (RUQ)  There is no guarding or rebound  Musculoskeletal:         General: No swelling or tenderness  Skin:     General: Skin is warm and dry  Capillary Refill: Capillary refill takes less than 2 seconds  Neurological:      General: No focal deficit present  Mental Status: He is alert and oriented to person, place, and time  Psychiatric:         Mood and Affect: Mood normal          Behavior: Behavior normal          Lab Results:   I have personally reviewed pertinent lab results    , CBC:   Lab Results   Component Value Date    WBC 5 76 10/05/2022    HGB 14 6 10/05/2022    HCT 41 0 10/05/2022    MCV 86 10/05/2022     10/05/2022    MCH 30 5 10/05/2022    MCHC 35 6 10/05/2022    RDW 12 1 10/05/2022    MPV 10 5 10/05/2022    NRBC 0 10/05/2022   , CMP:   Lab Results   Component Value Date    SODIUM 138 10/05/2022    K 3 9 10/05/2022     10/05/2022    CO2 26 10/05/2022    BUN 15 10/05/2022    CREATININE 0 88 10/05/2022    CALCIUM 9 1 10/05/2022    AST 29 10/05/2022    ALT 31 10/05/2022    ALKPHOS 67 10/05/2022    EGFR 105 10/05/2022   , Coagulation: No results found for: PT, INR, APTT, Urinalysis: No results found for: Cynthia Stain, SPECGRAV, PHUR, LEUKOCYTESUR, NITRITE, PROTEINUA, GLUCOSEU, KETONESU, BILIRUBINUR, BLOODU  Imaging: I have personally reviewed pertinent reports  10/5 CTCAP: IMPRESSION:  No acute consolidation  Mild gallbladder wall thickening with cholelithiasis, evaluate for acute cholecystitis  Splenomegaly, etiology unclear  No biliary dilation seen    10/5 RUQ u/s:   IMPRESSION:  Cholelithiasis with gallbladder wall thickening and biliary sludge  Findings are indeterminate for cholecystitis  Consider gallbladder scan  EKG, Pathology, and Other Studies: I have personally reviewed pertinent reports  Code Status: Level 1 - Full Code  Advance Directive and Living Will:      Power of :    POLST:      Counseling / Coordination of Care  Total floor / unit time spent today 30 minutes  This involved direct patient contact where I performed a full history and physical, reviewed previous records, and reviewed laboratory and other diagnostic studies  Greater than 50% of total time was spent with the patient and / or family counseling and / or coordination of care      [facility]  10/5/2022

## 2022-10-05 NOTE — LETTER
[address] [address]  Dept: [phone]    October 6, 2022     Patient: Kyra Elmore   YOB: 1980   Date of Visit: 10/5/2022       To Whom it May Concern:    Kyra Elmore is under my professional care  He was seen in the hospital from 10/5/2022   to 10/06/22  He may return to work on 10/14/22 with the following limitations no heavy lifting (<15 lbs) for 2 weeks  If you have any questions or concerns, please don't hesitate to call           Sincerely,          KIM Srinivasan

## 2022-10-05 NOTE — ASSESSMENT & PLAN NOTE
No results found for: HGBA1C    No results for input(s): POCGLU in the last 72 hours  Blood Sugar Average: Last 72 hrs:    · Blood glucose level on presentation 288    Home Regimen: None    Plan:  • Hold oral antihyperglycemics  • Diet Consistent CHO Level 2 (5 CHO servings/75g CHO per meal)  • Insulin regimen  o SS Insulin correction ACHS  • Goal BG  while admitted, adjusting insulin regimen as appropriate  • Glucose checks:  Accu cheks q 6 hrs  • Monitor for hypoglycemia and treat per protocol  • Will initiate Lantus 10 units pending accu check with first occurrence now  • Will hold off on bolus insulin  • Recheck HgbA1c

## 2022-10-05 NOTE — ASSESSMENT & PLAN NOTE
· Patient admitted to service of general surgery for management of acute cholescystitis secondary to cholelithiasis  · CT abdomen showed mild gallbladder wall thickening with cholelithiasis    Plan:  · Primary management deferred to general surgery team

## 2022-10-05 NOTE — CONSULTS
Maureen Mata [address] 1980, 36 y o  male MRN: [id number]  Unit/Bed#: ED-32 Encounter: [id number]  Primary Care Provider: No primary care provider on file  Date and time admitted to hospital: 10/5/2022  5:33 AM    Consults    * Hyperglycemia due to diabetes mellitus ([facility] )  Assessment & Plan  No results found for: HGBA1C    No results for input(s): POCGLU in the last 72 hours  Blood Sugar Average: Last 72 hrs:    · Blood glucose level on presentation 288    Home Regimen: None    Plan:  • Hold oral antihyperglycemics  • Patient NPO for possible surgical intervention  • Insulin regimen  o SS Insulin correction ACHS  • Goal BG  while admitted, adjusting insulin regimen as appropriate  • Glucose checks: Accu cheks q 6 hrs  • Monitor for hypoglycemia and treat per protocol  • Will initiate Lantus 10 units pending accu check with first occurrence now  • Will hold off on bolus insulin  • Recheck HgbA1c        VTE Prophylaxis:   Moderate Risk (Score 3-4) - Pharmacological DVT Prophylaxis Ordered: enoxaparin (Lovenox)  Recommendations for Discharge:  · Following discharge, recommend initiating metformin 500 mg BID  · Outpatient follow up with ophthalmology     Collaboration of Care: Were Recommendations Directly Discussed with Primary Treatment Team? No    History of Present Illness:  Dona Carrion is a 36 y o  male who is originally admitted to the general surgery service due to findings suggestive of possible acute cholecystitis secondary to cholelithiasis  We are consulted for inpatient hyperglycemia management due to blood glucose level of 288  Patient only has a known history of prediabetes with no prior hemoglobin A1c values  He did mention his most recent A1c was in 2018 and was 4 7%  He was also hospitalized in 2010 with a blood glucose greater than 900 and was started on metformin and glipizide   He was taken off of metformin and glipizide in 2014 due to improvement in glucose levels from active weight loss and dieting and has not been on any oral agents since  Patient denies polyuria, polydipsia, unintentional weight loss, numbness and tingling, and nausea and vomiting  He previously weighed 204 pounds approximately three months ago and is now down to 180 pounds  Review of Systems:  Review of Systems   Constitutional: Negative for chills and fever  HENT: Negative for rhinorrhea and voice change  Eyes: Negative for pain and visual disturbance  Respiratory: Negative for cough and shortness of breath  Cardiovascular: Negative for chest pain and palpitations  Gastrointestinal: Positive for abdominal pain  Negative for abdominal distention, nausea and vomiting  Endocrine: Negative for polydipsia and polyuria  Genitourinary: Negative for dysuria, frequency and hematuria  Musculoskeletal: Negative for arthralgias and back pain  Skin: Negative for color change and rash  Neurological: Negative for dizziness, weakness, numbness and headaches  Psychiatric/Behavioral: Negative for confusion  The patient is not nervous/anxious  All other systems reviewed and are negative  Past Medical and Surgical History:   History reviewed  No pertinent past medical history  History reviewed  No pertinent surgical history      Meds/Allergies:  all medications and allergies reviewed    Allergies: No Known Allergies    Social History:  Marital Status: Single  Substance Use History:   Social History     Substance and Sexual Activity   Alcohol Use None     Social History     Tobacco Use   Smoking Status Not on file   Smokeless Tobacco Not on file     Social History     Substance and Sexual Activity   Drug Use Not on file       Family History:  Family History   Problem Relation Age of Onset   • Diabetes unspecified Mother    • Diabetes type I Brother        Physical Exam:   Vitals:   Blood Pressure: 123/75 (10/05/22 1120)  Pulse: 65 (10/05/22 1120)  Temperature: 98 5 °F (36 9 °C) (10/05/22 0534)  Temp Source: Oral (10/05/22 0534)  Respirations: 16 (10/05/22 1120)  SpO2: 96 % (10/05/22 1120)    Physical Exam  Constitutional:       General: He is not in acute distress  Appearance: Normal appearance  He is overweight  He is not ill-appearing  HENT:      Head: Normocephalic and atraumatic  Right Ear: External ear normal       Left Ear: External ear normal       Nose: Nose normal       Mouth/Throat:      Mouth: Mucous membranes are moist    Eyes:      General: Lids are normal  No scleral icterus  Conjunctiva/sclera: Conjunctivae normal    Cardiovascular:      Rate and Rhythm: Normal rate and regular rhythm  Pulses: Normal pulses  Heart sounds: Normal heart sounds  No murmur heard  Pulmonary:      Effort: Pulmonary effort is normal  No respiratory distress  Breath sounds: Normal breath sounds  No decreased breath sounds, wheezing, rhonchi or rales  Abdominal:      General: Abdomen is flat  Bowel sounds are normal  There is no distension  Palpations: Abdomen is soft  Tenderness: There is abdominal tenderness in the right upper quadrant  There is no guarding  Musculoskeletal:      Cervical back: Neck supple  Right lower leg: No edema  Left lower leg: No edema  Skin:     General: Skin is warm and dry  Neurological:      General: No focal deficit present  Mental Status: He is alert and oriented to person, place, and time  Sensory: Sensation is intact  Motor: Motor function is intact     Psychiatric:         Mood and Affect: Mood normal          Behavior: Behavior normal           Additional Data:   Lab Results:    Results from last 7 days   Lab Units 10/05/22  0613   WBC Thousand/uL 5 76   HEMOGLOBIN g/dL 14 6   HEMATOCRIT % 41 0   PLATELETS Thousands/uL 201   NEUTROS PCT % 66   LYMPHS PCT % 26   MONOS PCT % 8   EOS PCT % 0     Results from last 7 days   Lab Units 10/05/22  0613   SODIUM mmol/L 138   POTASSIUM mmol/L 3 9   CHLORIDE mmol/L 104   CO2 mmol/L 26   BUN mg/dL 15   CREATININE mg/dL 0 88   ANION GAP mmol/L 8   CALCIUM mg/dL 9 1   ALBUMIN g/dL 4 1   TOTAL BILIRUBIN mg/dL 3 08*   ALK PHOS U/L 67   ALT U/L 31   AST U/L 29   GLUCOSE RANDOM mg/dL 288*             No results found for: HGBA1C            Imaging: Reviewed radiology reports from this admission including: chest CT scan, abdominal/pelvic CT and MRI abdomen/MRCP  US right upper quadrant   Final Result by Pedro Connor MD (10/05 3028)      Cholelithiasis with gallbladder wall thickening and biliary sludge  Findings are indeterminate for cholecystitis  Consider gallbladder scan  The study was marked in [facility] for immediate notification  Workstation performed: [id number]         CT chest abdomen pelvis w contrast   Final Result by Amalia Beth MD (10/05 1010)      No acute consolidation   Mild gallbladder wall thickening with cholelithiasis, evaluate for acute cholecystitis   Splenomegaly, etiology unclear   No biliary dilation seen   The study was marked in [employer] for immediate notification  Workstation performed: [id number]         MRI abdomen wo contrast and mrcp    (Results Pending)       EKG, Pathology, and Other Studies Reviewed on Admission:   · EKG: No EKG obtained  ** Please Note: This note may have been constructed using a voice recognition system  **

## 2022-10-06 VITALS
SYSTOLIC BLOOD PRESSURE: 120 MMHG | TEMPERATURE: 98 F | RESPIRATION RATE: 18 BRPM | DIASTOLIC BLOOD PRESSURE: 81 MMHG | HEART RATE: 95 BPM | OXYGEN SATURATION: 96 %

## 2022-10-06 PROBLEM — K81.0 ACUTE CHOLECYSTITIS: Status: RESOLVED | Noted: 2022-10-05 | Resolved: 2022-10-06

## 2022-10-06 LAB
ALBUMIN SERPL BCP-MCNC: 3.6 G/DL (ref 3.5–5)
ALP SERPL-CCNC: 59 U/L (ref 34–104)
ALT SERPL W P-5'-P-CCNC: 58 U/L (ref 7–52)
ANION GAP SERPL CALCULATED.3IONS-SCNC: 5 MMOL/L (ref 4–13)
AST SERPL W P-5'-P-CCNC: 49 U/L (ref 13–39)
BASOPHILS # BLD AUTO: 0.01 THOUSANDS/ΜL (ref 0–0.1)
BASOPHILS NFR BLD AUTO: 0 % (ref 0–1)
BILIRUB SERPL-MCNC: 2.47 MG/DL (ref 0.2–1)
BUN SERPL-MCNC: 17 MG/DL (ref 5–25)
CALCIUM SERPL-MCNC: 8.7 MG/DL (ref 8.4–10.2)
CHLORIDE SERPL-SCNC: 106 MMOL/L (ref 96–108)
CO2 SERPL-SCNC: 26 MMOL/L (ref 21–32)
CREAT SERPL-MCNC: 0.86 MG/DL (ref 0.6–1.3)
EOSINOPHIL # BLD AUTO: 0 THOUSAND/ΜL (ref 0–0.61)
EOSINOPHIL NFR BLD AUTO: 0 % (ref 0–6)
ERYTHROCYTE [DISTWIDTH] IN BLOOD BY AUTOMATED COUNT: 12.1 % (ref 11.6–15.1)
GFR SERPL CREATININE-BSD FRML MDRD: 106 ML/MIN/1.73SQ M
GLUCOSE SERPL-MCNC: 203 MG/DL (ref 65–140)
GLUCOSE SERPL-MCNC: 219 MG/DL (ref 65–140)
GLUCOSE SERPL-MCNC: 226 MG/DL (ref 65–140)
GLUCOSE SERPL-MCNC: 252 MG/DL (ref 65–140)
GLUCOSE SERPL-MCNC: 338 MG/DL (ref 65–140)
HCT VFR BLD AUTO: 40.1 % (ref 36.5–49.3)
HGB BLD-MCNC: 14.1 G/DL (ref 12–17)
IMM GRANULOCYTES # BLD AUTO: 0.06 THOUSAND/UL (ref 0–0.2)
IMM GRANULOCYTES NFR BLD AUTO: 1 % (ref 0–2)
LYMPHOCYTES # BLD AUTO: 0.78 THOUSANDS/ΜL (ref 0.6–4.47)
LYMPHOCYTES NFR BLD AUTO: 8 % (ref 14–44)
MCH RBC QN AUTO: 30.3 PG (ref 26.8–34.3)
MCHC RBC AUTO-ENTMCNC: 35.2 G/DL (ref 31.4–37.4)
MCV RBC AUTO: 86 FL (ref 82–98)
MONOCYTES # BLD AUTO: 0.48 THOUSAND/ΜL (ref 0.17–1.22)
MONOCYTES NFR BLD AUTO: 5 % (ref 4–12)
NEUTROPHILS # BLD AUTO: 8.73 THOUSANDS/ΜL (ref 1.85–7.62)
NEUTS SEG NFR BLD AUTO: 86 % (ref 43–75)
NRBC BLD AUTO-RTO: 0 /100 WBCS
PLATELET # BLD AUTO: 201 THOUSANDS/UL (ref 149–390)
PMV BLD AUTO: 10.9 FL (ref 8.9–12.7)
POTASSIUM SERPL-SCNC: 4.3 MMOL/L (ref 3.5–5.3)
PROT SERPL-MCNC: 5.4 G/DL (ref 6.4–8.4)
RBC # BLD AUTO: 4.65 MILLION/UL (ref 3.88–5.62)
SODIUM SERPL-SCNC: 137 MMOL/L (ref 135–147)
WBC # BLD AUTO: 10.06 THOUSAND/UL (ref 4.31–10.16)

## 2022-10-06 PROCEDURE — 99232 SBSQ HOSP IP/OBS MODERATE 35: CPT | Performed by: INTERNAL MEDICINE

## 2022-10-06 PROCEDURE — 82948 REAGENT STRIP/BLOOD GLUCOSE: CPT

## 2022-10-06 PROCEDURE — 85025 COMPLETE CBC W/AUTO DIFF WBC: CPT | Performed by: SURGERY

## 2022-10-06 PROCEDURE — 80053 COMPREHEN METABOLIC PANEL: CPT | Performed by: SURGERY

## 2022-10-06 PROCEDURE — 99024 POSTOP FOLLOW-UP VISIT: CPT | Performed by: SURGERY

## 2022-10-06 RX ORDER — OXYCODONE HYDROCHLORIDE 10 MG/1
10 TABLET ORAL EVERY 4 HOURS PRN
Status: DISCONTINUED | OUTPATIENT
Start: 2022-10-06 | End: 2022-10-06 | Stop reason: HOSPADM

## 2022-10-06 RX ORDER — OXYCODONE HYDROCHLORIDE 5 MG/1
5 TABLET ORAL EVERY 4 HOURS PRN
Status: DISCONTINUED | OUTPATIENT
Start: 2022-10-06 | End: 2022-10-06 | Stop reason: HOSPADM

## 2022-10-06 RX ORDER — INSULIN LISPRO 100 [IU]/ML
1-6 INJECTION, SOLUTION INTRAVENOUS; SUBCUTANEOUS
Status: DISCONTINUED | OUTPATIENT
Start: 2022-10-06 | End: 2022-10-06 | Stop reason: HOSPADM

## 2022-10-06 RX ORDER — ACETAMINOPHEN 325 MG/1
500 TABLET ORAL EVERY 6 HOURS PRN
Refills: 0 | COMMUNITY
Start: 2022-10-06

## 2022-10-06 RX ORDER — INSULIN LISPRO 100 [IU]/ML
2-12 INJECTION, SOLUTION INTRAVENOUS; SUBCUTANEOUS
Status: DISCONTINUED | OUTPATIENT
Start: 2022-10-06 | End: 2022-10-06 | Stop reason: HOSPADM

## 2022-10-06 RX ADMIN — SODIUM CHLORIDE, SODIUM LACTATE, POTASSIUM CHLORIDE, AND CALCIUM CHLORIDE 125 ML/HR: .6; .31; .03; .02 INJECTION, SOLUTION INTRAVENOUS at 06:26

## 2022-10-06 RX ADMIN — KETOROLAC TROMETHAMINE 15 MG: 30 INJECTION, SOLUTION INTRAMUSCULAR at 11:53

## 2022-10-06 RX ADMIN — OXYCODONE HYDROCHLORIDE 10 MG: 10 TABLET ORAL at 06:27

## 2022-10-06 RX ADMIN — INSULIN LISPRO 3 UNITS: 100 INJECTION, SOLUTION INTRAVENOUS; SUBCUTANEOUS at 02:54

## 2022-10-06 RX ADMIN — INSULIN LISPRO 4 UNITS: 100 INJECTION, SOLUTION INTRAVENOUS; SUBCUTANEOUS at 11:53

## 2022-10-06 RX ADMIN — KETOROLAC TROMETHAMINE 15 MG: 30 INJECTION, SOLUTION INTRAMUSCULAR at 06:24

## 2022-10-06 RX ADMIN — ACETAMINOPHEN 975 MG: 325 TABLET, FILM COATED ORAL at 11:53

## 2022-10-06 RX ADMIN — INSULIN LISPRO 4 UNITS: 100 INJECTION, SOLUTION INTRAVENOUS; SUBCUTANEOUS at 08:19

## 2022-10-06 RX ADMIN — ACETAMINOPHEN 975 MG: 325 TABLET, FILM COATED ORAL at 04:00

## 2022-10-06 NOTE — UTILIZATION REVIEW
Inpatient Admission Authorization Request   NOTIFICATION OF INPATIENT ADMISSION/INPATIENT AUTHORIZATION REQUEST   SERVICING FACILITY:   [address]  [place] [address]  Tax ID: [id number]  NPI: [id number]  Place of Service: Inpatient [facility]  [address]  Place of Service Code: 24     ATTENDING PROVIDER:  Attending Name and NPI#: Brandie Franklin [[id number]]  Address: [address] [place] [address]  [place]  [address]  Phone: [phone]     UTILIZATION REVIEW CONTACT:  Gavin Rust Utilization   Network Utilization Review Department  Phone: [phone]  Fax: [phone]  Email: [email or web address]     PHYSICIAN ADVISORY SERVICES:  FOR GQYV-TA-AJDD REVIEW - MEDICAL NECESSITY DENIAL  Phone: [phone]  Fax: [phone]  Email: [email or web address]     TYPE OF REQUEST:  Inpatient Status     ADMISSION INFORMATION:  ADMISSION DATE/TIME: 10/5/22 10:24 AM  PATIENT DIAGNOSIS CODE/DESCRIPTION:  Acute cholecystitis [K81 0]  RUQ pain [R10 11]  Rib pain [R07 81]  Prediabetes [R73 03]  DISCHARGE DATE/TIME: No discharge date for patient encounter  IMPORTANT INFORMATION:  Please contact Gavin Rust directly with any questions or concerns regarding this request  Department voicemails are confidential     Send requests for admission clinical reviews, concurrent reviews, approvals, and administrative denials due to lack of clinical to fax [phone]

## 2022-10-06 NOTE — PROGRESS NOTES
Progress Note - General Surgery  Javan Gardiner 36 y o  male MRN: [id number]  Unit/Bed#: W -01 Encounter: [id number]      Assessment:  36 y o  male with cholecystitis now s/p laparoscopic cholecystectomy on 10/5  Tolerated well  Post-operative course complicated by hyperglycemia  Plan:  - CCD2 diet  - SSI, will discuss with medicine recs regarding insulin management and discharge recs  - DVT ppx  - PRN analgesia and antiemetics  - Dispo planing, likely dc today        Subjective: No acute events overnight  Afebrile, hemodynamically stable  Tolerating diet  No nausea, or vomiting, fevers or chills  Endorsing expected post-operative abdominal pain  Objective:   Temp:  [97 5 °F (36 4 °C)-98 3 °F (36 8 °C)] 98 °F (36 7 °C)  HR:  [8-95] 95  Resp:  [16-20] 18  BP: (119-143)/(72-89) 120/81    Physical Exam:  General: No acute distress, alert and oriented  CV: Well perfused, regular rate and rhythm  Lungs: Normal work of breathing, no increased respiratory effort  Abdomen: Soft, appropriately tender, non-distended  Incision(s) clean, dry and intact  Extremities: No edema, clubbing or cyanosis  Skin: Warm, dry      I/O       10/04 0701  10/05 0700 10/05 0701  10/06 0700 10/06 0701  10/07 0700    P  O    540    I V   1650     Total Intake  1650 540    Net  +1650 +540                 Lab, Imaging and other studies: I have personally reviewed pertinent reports    , CBC with diff:   Lab Results   Component Value Date    WBC 10 06 10/06/2022    HGB 14 1 10/06/2022    HCT 40 1 10/06/2022    MCV 86 10/06/2022     10/06/2022    MCH 30 3 10/06/2022    MCHC 35 2 10/06/2022    RDW 12 1 10/06/2022    MPV 10 9 10/06/2022    NRBC 0 10/06/2022   , BMP/CMP:   Lab Results   Component Value Date    SODIUM 137 10/06/2022    K 4 3 10/06/2022     10/06/2022    CO2 26 10/06/2022    BUN 17 10/06/2022    CREATININE 0 86 10/06/2022    CALCIUM 8 7 10/06/2022    AST 49 (H) 10/06/2022    ALT 58 (H) 10/06/2022 ALKPHOS 59 10/06/2022    EGFR 106 10/06/2022         Rc Chadwick 61  10/6/2022 8:42 AM

## 2022-10-06 NOTE — QUICK NOTE
Post Op Check:    Patients pain is well controlled  They denied any nausea, chest pain, or shortness of breath  General: NAD  HENT: NCAT MMM  Neck: supple, no JVD  CV: nl rate  Lungs: nl wob   No resp distress  ABD: Soft, nontender, nondistended  Extrem: No CCE  Neuro: AAOx3     Plan:  Diet Regular; Regular House  Continue to monitor  Pain and nausea control PRN  Patient with hyperglycemia, insulin given, monitor BG    Alena Sharma

## 2022-10-06 NOTE — UTILIZATION REVIEW
Inpatient Admission Authorization Request   NOTIFICATION OF INPATIENT ADMISSION/INPATIENT AUTHORIZATION REQUEST   SERVICING FACILITY:   [address] [facility], [address] [place] [address]  Tax ID: [id number]  NPI: [id number]  Place of Service: [address]patient [address]  Place of Service Code: 24     ATTENDING PROVIDER:  Attending Name and NPI#: Brandie Alexis [[id number]]  Address: [address] [place] [address] [place]  [place], [address] [place] [address]  Phone: [phone]     UTILIZATION REVIEW CONTACT:  Rivka Rushing, Utilization   Network Utilization Review Department  Phone: [phone]  Fax: [phone]  Email: [email or web address]     PHYSICIAN ADVISORY SERVICES:  FOR HESX-MV-DYHQ REVIEW - MEDICAL NECESSITY DENIAL  Phone: [phone]  Fax: [phone]  Email: [email or web address]     TYPE OF REQUEST:  Inpatient Status     ADMISSION INFORMATION:  ADMISSION DATE/TIME: 10/5/22 10:24 AM  PATIENT DIAGNOSIS CODE/DESCRIPTION:  Acute cholecystitis [K81 0]  RUQ pain [R10 11]  Rib pain [R07 81]  Prediabetes [R73 03]  DISCHARGE DATE/TIME: 10/6/2022 12:59 PM   IMPORTANT INFORMATION:  Please contact Rivka Rushing directly with any questions or concerns regarding this request  Department voicemails are confidential     Send requests for admission clinical reviews, concurrent reviews, approvals, and administrative denials due to lack of clinical to fax [phone]

## 2022-10-06 NOTE — PROGRESS NOTES
[facility]  Progress Note Mac Faulkner 1980, 36 y o  male MRN: [id number]  Unit/Bed#: W -01 Encounter: [id number]  Primary Care Provider: No primary care provider on file  Date and time admitted to hospital: 10/5/2022  5:33 AM    * Hyperglycemia due to diabetes mellitus [facility])  Assessment & Plan  Lab Results   Component Value Date    HGBA1C 9 1 (H) 10/05/2022       Recent Labs     10/06/22  0001 10/06/22  0245 10/06/22  0728 10/06/22  1126   POCGLU 338* 252* 203* 219*       Blood Sugar Average: Last 72 hrs:    · Blood glucose level on presentation 288    Home Regimen: None    Plan:  • Patient can be discharged on metformin 500 b i d  With meals  • Patient mentioned that he has been on metformin previously  • Patient advised to monitor his blood glucose at home  • Outpatient follow-up with PCP  Acute cholecystitis-resolved as of 10/6/2022  Assessment & Plan  · Status post lap cholecystectomy  · Now tolerating diet  Asymptomatic  · Management per primary surgical team       Patient cleared for discharge from medical standpoint  Subjective:   Pt seen and examined by me this morning  Pt denied any complaints  Objective:     Vitals:   Temp (24hrs), Av °F (36 7 °C), Min:97 5 °F (36 4 °C), Max:98 3 °F (36 8 °C)    Temp:  [97 5 °F (36 4 °C)-98 3 °F (36 8 °C)] 98 °F (36 7 °C)  HR:  [8-95] 95  Resp:  [16-20] 18  BP: (120-143)/(75-89) 120/81  SpO2:  [95 %-100 %] 96 %  There is no height or weight on file to calculate BMI  Input and Output Summary (last 24 hours): Intake/Output Summary (Last 24 hours) at 10/6/2022 1155  Last data filed at 10/6/2022 0906  Gross per 24 hour   Intake 2523 33 ml   Output --   Net 2523 33 ml       Physical Exam:     Physical Exam    Constitutional: Pt appears comfortable  Not in any acute distress  Cardiovascular: Normal rate, regular rhythm, normal heart sounds  No murmur heard    Pulmonary/Chest: Effort normal, air entry b/l equal  No respiratory distress  Pt has no wheezes or crackles  Abdominal: Soft  Non-distended, mildly tender  Bowel sounds are normal    Musculoskeletal: Normal range of motion  Neurological: awake, alert  Moving all extremities spontaneously  Psychiatric: normal mood and affect  Additional Data:     Labs:    Results from last 7 days   Lab Units 10/06/22  0500   WBC Thousand/uL 10 06   HEMOGLOBIN g/dL 14 1   HEMATOCRIT % 40 1   PLATELETS Thousands/uL 201   NEUTROS PCT % 86*   LYMPHS PCT % 8*   MONOS PCT % 5   EOS PCT % 0     Results from last 7 days   Lab Units 10/06/22  0500   SODIUM mmol/L 137   POTASSIUM mmol/L 4 3   CHLORIDE mmol/L 106   CO2 mmol/L 26   BUN mg/dL 17   CREATININE mg/dL 0 86   ANION GAP mmol/L 5   CALCIUM mg/dL 8 7   ALBUMIN g/dL 3 6   TOTAL BILIRUBIN mg/dL 2 47*   ALK PHOS U/L 59   ALT U/L 58*   AST U/L 49*   GLUCOSE RANDOM mg/dL 226*         Results from last 7 days   Lab Units 10/06/22  1126 10/06/22  0728 10/06/22  0245 10/06/22  0001 10/05/22  2325 10/05/22  1738 10/05/22  1326   POC GLUCOSE mg/dl 219* 203* 252* 338* 374* 165* 164*     Results from last 7 days   Lab Units 10/05/22  0613   HEMOGLOBIN A1C % 9 1*               * I Have Reviewed All Lab Data Listed Above  * Additional Pertinent Lab Tests Reviewed:  [address] Admission Reviewed    Imaging:    Imaging Reports Reviewed Today Include:   Imaging Personally Reviewed by Myself Includes:      Recent Cultures (last 7 days):           Last 24 Hours Medication List:   Current Facility-Administered Medications   Medication Dose Route Frequency Provider Last Rate   • acetaminophen  975 mg Oral [facility] Lawrence Jo Ann, DO     • enoxaparin  40 mg Subcutaneous Daily Lawrence Jo Ann, DO     • influenza vaccine  0 5 mL Intramuscular Once Jarret Mejia MD     • insulin lispro  1-6 Units Subcutaneous HS Rc Torres MD     • insulin lispro  1-6 Units Subcutaneous 0200 Rc Torres MD     • insulin lispro  2-12 Units Subcutaneous TID AC Sherral Cheadle, MD     • ketorolac  15 mg Intravenous [facility] Whitney Patter, DO     • methocarbamol  500 mg Oral Q6H PRN Ted Guamanter, DO     • ondansetron  4 mg Intravenous Q6H PRN Ted Guamanter, DO     • oxyCODONE  10 mg Oral Q4H PRN Sherral Cheadle, MD     • oxyCODONE  5 mg Oral Q4H PRN Sherral Cheadle, MD          Today, Patient Was Seen By: Rosibel Oliveros    ** Please Note: Dictation voice to text software may have been used in the creation of this document   **

## 2022-10-06 NOTE — ASSESSMENT & PLAN NOTE
· Status post lap cholecystectomy  · Now tolerating diet  Asymptomatic    · Management per primary surgical team

## 2022-10-06 NOTE — UTILIZATION REVIEW
Initial Clinical Review    Admission: Date/Time/Statement:   Admission Orders (From admission, onward)     Ordered        10/05/22 1024  Inpatient Admission  Once                      Orders Placed This Encounter   Procedures   • Inpatient Admission     Standing Status:   Standing     Number of Occurrences:   1     Order Specific Question:   Level of Care     Answer:   Med Surg [16]     Order Specific Question:   Estimated length of stay     Answer:   More than 2 Midnights     Order Specific Question:   Certification     Answer:   I certify that inpatient services are medically necessary for this patient for a duration of greater than two midnights  See H&P and MD Progress Notes for additional information about the patient's course of treatment  ED Arrival Information     Expected   -    Arrival   10/5/2022 05:32    Acuity   Urgent            Means of arrival   Ambulance    Escorted by   [facility]    Service   Surgery-General    Admission type   Emergency            Arrival complaint   rib pain           Chief Complaint   Patient presents with   • Chest Pain     Pt leaned over on Saturday and felt a "pop" in his R side ribcage, pain since        Initial Presentation: 36 y o  male currently receiving IP rehab at [facility] jo presents to ED via EMS with RUQ pain x 4 days with radiation to back  On exam, pt has RUQ tenderness, abdomen soft, non distended   , has dry mucous membranes  Labs -WBC WNL, T bili 3 08, glucose elevated   CT A/P shows cholelithiasis, evidence of acute cholecystitis with US RUQ having indeterminate findings regarding cholecystitis   Pt given IVF, IV abx, IV analgesic, SQ insulin in ED  Pt admitted as Inpatient by general surgery service with cholecystitis , r/o choledocholithiasis   Plan - NPO, IVF, MRI/MRCP, trend T bili, IV abx- Ancef and Flagyl, pain control, internal med consult for elevated glucose, SSI   Possible lap charity this admission pending MRI results   Internal med consult-remote history of diabetes, hyperglycemia, resolved after weight loss  Initial blood glucose in a m  labs was 288 however repeat blood glucose around 12:30 p m  was 164  Patient also admits that he is not watching his diet  Pt currently NPO  Plan - SSI, Accucheks q6h  hold off on initiating Lantus at this time  Will monitor blood sugars and if remains more than 200 may need Lantus 5 units at bedtime or in a  m  Check lipids  10//5/22 @1641  Procedure(s) (LRB):  CHOLECYSTECTOMY LAPAROSCOPIC   general anesthesia  No complications, minimal EBL  Date: 10/6  POD #1 Day 2:    Gen'l surgery-Post-operative course complicated by hyperglycemia, high 374 yesterday  Glucose down to 203 this am  SSI, Acucheks continue   Pt tolerating diet w/o N/V  No fevers or chills  Pain abdominal - appropriately tender, non-distended, soft abdomen  Incisions C/D/I    Per nursing, pain 8/10, pt received prn po narcotic analgesic and scheduled IV Toradol  IV abx d/c'ed yesterday   T bili down to 2 47 today  Medicine-Patient can be discharged on metformin 500 b i d  With meals  advised to monitor his blood glucose at home   Outpatient follow-up with PCP        ED Triage Vitals   Temperature Pulse Respirations Blood Pressure SpO2   10/05/22 0534 10/05/22 0534 10/05/22 0534 10/05/22 0534 10/05/22 0534   98 5 °F (36 9 °C) 89 20 127/65 98 %      Temp Source Heart Rate Source Patient Position - Orthostatic VS BP Location FiO2 (%)   10/05/22 0534 10/05/22 0534 10/05/22 0534 10/05/22 0534 --   Oral Monitor Lying Right arm       Pain Score       10/05/22 0849       8          Wt Readings from Last 1 Encounters:   10/05/22 81 8 kg (180 lb 5 4 oz)     Additional Vital Signs:   Date/Time Temp Pulse Resp BP MAP (mmHg) SpO2 O2 Flow Rate (L/min)   10/06/22 07:31:16 98 °F (36 7 °C) 95 18 120/81 94 96 % --   10/05/22 21:20:46 98 3 °F (36 8 °C) 75 16 123/75 91 97 % --   10/05/22 18:34:02 97 5 °F (36 4 °C) 62 17 125/85 98 97 % -- 10/05/22 1800 -- 70 20 136/81 104 95 % --   10/05/22 1745 -- 63 20 143/89 110 97 % --   10/05/22 1730 -- 65 20 127/79 99 100 % 4 L/min   10/05/22 1720 98 2 °F (36 8 °C) 69 20 136/81 102 99 % 4 L/min   10/05/22 1517 98 2 °F (36 8 °C) 8 Abnormal  18 121/81 -- 100 % --   10/05/22 1120 -- 65 16 123/75 -- 96 % --   10/05/22 0849 -- 82 18 119/72 -- 98 % --       Pertinent Labs/Diagnostic Test Results:   MRI abdomen wo contrast and mrcp   Final Result by Kun Laws MD (10/05 6738)      Cholelithiasis without MR evidence of acute cholecystitis  There is no biliary ductal dilatation or choledocholithiasis  GALLBLADDER:  1 9 cm diameter gallstone in gallbladder  There is no MR evidence of acute cholecystitis such as gallbladder distention or pericholecystic inflammatory changes  Workstation performed: [id number]         US right upper quadrant   Final Result by Kristin Huber MD (10/05 0714)      Cholelithiasis with gallbladder wall thickening and biliary sludge  Findings are indeterminate for cholecystitis  Consider gallbladder scan  The study was marked in [facility] for immediate notification  Workstation performed: [id number]         CT chest abdomen pelvis w contrast   Final Result by Maya Salamanca MD (10/05 1090)      No acute consolidation   Mild gallbladder wall thickening with cholelithiasis, evaluate for acute cholecystitis   Splenomegaly, etiology unclear   No biliary dilation seen   The study was marked in EPIC for immediate notification        Workstation performed: [id number]               Results from last 7 days   Lab Units 10/06/22  0500 10/05/22  0613   WBC Thousand/uL 10 06 5 76   HEMOGLOBIN g/dL 14 1 14 6   HEMATOCRIT % 40 1 41 0   PLATELETS Thousands/uL 201 201   NEUTROS ABS Thousands/µL 8 73* 3 75         Results from last 7 days   Lab Units 10/06/22  0500 10/05/22  0613   SODIUM mmol/L 137 138   POTASSIUM mmol/L 4 3 3 9   CHLORIDE mmol/L 106 104   CO2 mmol/L 26 26   ANION GAP mmol/L 5 8   BUN mg/dL 17 15   CREATININE mg/dL 0 86 0 88   EGFR ml/min/1 73sq m 106 105   CALCIUM mg/dL 8 7 9 1     Results from last 7 days   Lab Units 10/06/22  0500 10/05/22  0613   AST U/L 49* 29   ALT U/L 58* 31   ALK PHOS U/L 59 67   TOTAL PROTEIN g/dL 5 4* 6 1*   ALBUMIN g/dL 3 6 4 1   TOTAL BILIRUBIN mg/dL 2 47* 3 08*     Results from last 7 days   Lab Units 10/06/22  0728 10/06/22  0245 10/06/22  0001 10/05/22  2325 10/05/22  1738 10/05/22  1326   POC GLUCOSE mg/dl 203* 252* 338* 374* 165* 164*     Results from last 7 days   Lab Units 10/06/22  0500 10/05/22  0613   GLUCOSE RANDOM mg/dL 226* 288*         Results from last 7 days   Lab Units 10/05/22  0613   HEMOGLOBIN A1C % 9 1*   EAG mg/dl 214             Results from last 7 days   Lab Units 10/05/22  0613   LIPASE u/L <6*                     ED Treatment:   Medication Administration from 10/05/2022 0532 to 10/05/2022 1516       Date/Time Order Dose Route Action     10/05/2022 0614 ketorolac (TORADOL) injection 15 mg 15 mg Intravenous Given     10/05/2022 0700 iohexol (OMNIPAQUE) 350 MG/ML injection (SINGLE-DOSE) 100 mL 100 mL Intravenous Given     10/05/2022 1043 lactated ringers infusion 125 mL/hr Intravenous New Bag     10/05/2022 1320 ceFAZolin (ANCEF) IVPB (premix in dextrose) 2,000 mg 50 mL 2,000 mg Intravenous New Bag     10/05/2022 1118 metroNIDAZOLE (FLAGYL) IVPB (premix) 500 mg 100 mL 500 mg Intravenous New Bag     10/05/2022 1424 insulin lispro (HumaLOG) 100 units/mL subcutaneous injection 1-6 Units 1 Units Subcutaneous Given        History reviewed  No pertinent past medical history    Present on Admission:  **None**      Admitting Diagnosis: Acute cholecystitis [K81 0]  RUQ pain [R10 11]  Rib pain [R07 81]  Prediabetes [R73 03]  Age/Sex: 36 y o  male  Admission Orders:  Scheduled Medications:  acetaminophen, 975 mg, Oral, Q8H BLADIMIR  enoxaparin, 40 mg, Subcutaneous, Daily  influenza vaccine, 0 5 mL, Intramuscular, Once  insulin lispro, 1-6 Units, Subcutaneous, HS  insulin lispro, 1-6 Units, Subcutaneous, 0200  insulin lispro, 2-12 Units, Subcutaneous, TID AC  ketorolac, 15 mg, Intravenous, Q6H BLADIMIR    metroNIDAZOLE (FLAGYL) IVPB (premix) 500 mg 100 mL  Dose: 500 mg  Freq: Every 8 hours Route: IV  Last Dose: Stopped (10/05/22 1148)  Start: 10/05/22 1045 End: 10/05/22 1705  Continuous IV Infusions:    lactated ringers infusion  Rate: 125 mL/hr Dose: 125 mL/hr  Freq: Continuous Route: IV  Indications of Use: IV Hydration  Last Dose: Stopped (10/06/22 0906)  Start: 10/05/22 1030 End: 10/06/22 0857  PRN Meds:  methocarbamol, 500 mg, Oral, Q6H PRN  ondansetron, 4 mg, Intravenous, Q6H PRN  oxyCODONE, 10 mg, Oral, Q4H PRN x1 10/6  oxyCODONE, 5 mg, Oral, Q4H PRN    SCd's    monitor for hypoglycemia    IP CONSULT TO INTERNAL MEDICINE    Network Utilization Review Department  ATTENTION: Please call with any questions or concerns to [phone] and carefully listen to the prompts so that you are directed to the right person  All voicemails are confidential   Harlene Pair all requests for admission clinical reviews, approved or denied determinations and any other requests to dedicated fax number below belonging to the campus where the patient is receiving treatment   List of dedicated fax numbers for the Facilities:  [address] DENIALS (Administrative/Medical Necessity) [phone]   [address] (Maternity/NICU/Pediatrics) [phone]   [address] [phone]   [facility] 77 [phone]   1306 [facility] [address] [address] Lindy Leal 28 [phone] [address] [facility] [phone]   [address] [phone]

## 2022-10-06 NOTE — DISCHARGE INSTRUCTIONS
Please call the office when you leave to schedule an appointment for 2 weeks  This can be a virtual / telephone consultation or it can be in person  The choice is yours  Please call [phone]   [address], [address], [address]  Off of [address] between [facility] and [facility]  Activity:    May lift 10 lb as many times as desired the 1st week,       20 lb in 2 weeks,       30 lb in 3 weeks  Walking is encouraged  Normal daily activities including climbing steps are okay  Do not engage in strenuous activity ( sit-ups or crutches) or contact sports for 4-6 weeks post-operatively    Return to Work:   Okay to return to work when you feel well if you desire  Diet:   You may return to your normal healthy diet  Wound Care: Your wound is closed with dissolvable stitches and glue  It is okay to shower  Wash incision gently with soap and water and pat dry  Do not soak incisions in bath water or swim for two weeks  Do not apply any creams or ointments  Pain Medication:   Please take as directed if needed  May use Advil or Motrin in addition  Recall, the pain medicine and anesthesia is associated with constipation  No driving while taking narcotic pain medications  Other: It is normal to developed a “healing ridge” / firm incision after surgery  This is your body making scar tissue  It is a good sign  Constipation is very common after general anesthesia  Please use milk of magnesia as needed in order to help prevent constipation  It is normal to get bruising after surgery  If you have questions after discharge please call the office      If you have increased pain, fever >101 5, increased drainage, redness or a bad smell at your surgery site, please call us immediately or come directly to the Emergency Room

## 2022-10-06 NOTE — QUICK NOTE
[facility] [place], [place]  Carletta Boeck, updated on patient's discharge and new medications on discharge       Sherley Rudolph  10/06/22

## 2022-10-11 NOTE — UTILIZATION REVIEW
Notification of Discharge   This is a Notification of Discharge from our facility [address]  Please be advised that this patient has been discharge from our facility  Below you will find the admission and discharge date and time including the patient’s disposition  UTILIZATION REVIEW CONTACT:  Vicki Sarah MA  Utilization   Network Utilization Review Department  Phone: [phone] x carefully listen to the prompts  All voicemails are confidential   Email: [email or web address]     PHYSICIAN ADVISORY SERVICES:  FOR OWWO-XW-XRYT REVIEW - MEDICAL NECESSITY DENIAL  Phone: [phone]  Fax: [phone]  Email: [email or web address]     PRESENTATION DATE: 10/5/2022  5:33 AM  OBERVATION ADMISSION DATE:   INPATIENT ADMISSION DATE: 10/5/22 10:24 AM   DISCHARGE DATE: 10/6/2022 12:59 PM  DISPOSITION: Home/Self Care Home/Self Care      IMPORTANT INFORMATION:  Send all requests for admission clinical reviews, approved or denied determinations and any other requests to dedicated fax number below belonging to the campus where the patient is receiving treatment   List of dedicated fax numbers:  [address] [facility] [address] DENIALS (Administrative/Medical Necessity) [phone]   [address] (Maternity/NICU/Pediatrics) [phone]   [facility] [phone]   [place] [address] [phone]   [address] [phone]   220 [facility] [phone]   [address] [phone]   [address] [place] [address] 119 [phone]   [facility]  [phone]   [address] [facility] [phone]   [address] [facility] [address] [phone]

## 2022-10-13 PROCEDURE — 88304 TISSUE EXAM BY PATHOLOGIST: CPT | Performed by: PATHOLOGY

## (undated) DEVICE — LIGAMAX 5 MM ENDOSCOPIC MULTIPLE CLIP APPLIER: Brand: LIGAMAX

## (undated) DEVICE — PACK PBDS LAP CHOLE RF

## (undated) DEVICE — ADHESIVE SKIN HIGH VISCOSITY EXOFIN 1ML

## (undated) DEVICE — NEEDLE 22 G X 1 1/2 SAFETY

## (undated) DEVICE — TOWEL SURG XR DETECT GREEN STRL RFD

## (undated) DEVICE — INTENDED FOR TISSUE SEPARATION, AND OTHER PROCEDURES THAT REQUIRE A SHARP SURGICAL BLADE TO PUNCTURE OR CUT.: Brand: BARD-PARKER SAFETY BLADES SIZE 11, STERILE

## (undated) DEVICE — ELECTRODE LAP J HOOK SPLIT STEM E-Z CLEAN 33CM -0021S

## (undated) DEVICE — TROCAR: Brand: KII FIOS FIRST ENTRY

## (undated) DEVICE — UNDYED BRAIDED (POLYGLACTIN 910), SYNTHETIC ABSORBABLE SUTURE: Brand: COATED VICRYL

## (undated) DEVICE — TISSUE RETRIEVAL SYSTEM: Brand: INZII RETRIEVAL SYSTEM

## (undated) DEVICE — DRAPE EQUIPMENT RF WAND

## (undated) DEVICE — SUT MONOCRYL 4-0 PS-2 27 IN Y426H

## (undated) DEVICE — STERILE SURGICAL LUBRICANT,  TUBE: Brand: SURGILUBE

## (undated) DEVICE — GLOVE SRG BIOGEL ECLIPSE 7

## (undated) DEVICE — PAD GROUNDING ADULT

## (undated) DEVICE — TUBING SMOKE EVAC W/FILTRATION DEVICE PLUMEPORT ACTIV

## (undated) DEVICE — VIAL DECANTER

## (undated) DEVICE — TROCAR APPPLE 5MM EXTENDED LENGTH